# Patient Record
Sex: MALE
[De-identification: names, ages, dates, MRNs, and addresses within clinical notes are randomized per-mention and may not be internally consistent; named-entity substitution may affect disease eponyms.]

---

## 2019-08-09 ENCOUNTER — HOSPITAL ENCOUNTER (EMERGENCY)
Dept: HOSPITAL 5 - ED | Age: 34
Discharge: HOME | End: 2019-08-09
Payer: SELF-PAY

## 2019-08-09 VITALS — DIASTOLIC BLOOD PRESSURE: 90 MMHG | SYSTOLIC BLOOD PRESSURE: 161 MMHG

## 2019-08-09 DIAGNOSIS — G89.18: ICD-10-CM

## 2019-08-09 DIAGNOSIS — L03.116: Primary | ICD-10-CM

## 2019-08-09 DIAGNOSIS — Z79.899: ICD-10-CM

## 2019-08-09 DIAGNOSIS — F17.200: ICD-10-CM

## 2019-08-09 LAB
BASOPHILS # (AUTO): 0 K/MM3 (ref 0–0.1)
BASOPHILS NFR BLD AUTO: 0.7 % (ref 0–1.8)
BUN SERPL-MCNC: 9 MG/DL (ref 9–20)
BUN/CREAT SERPL: 11 %
CALCIUM SERPL-MCNC: 8.8 MG/DL (ref 8.4–10.2)
EOSINOPHIL # BLD AUTO: 0.2 K/MM3 (ref 0–0.4)
EOSINOPHIL NFR BLD AUTO: 2.7 % (ref 0–4.3)
HCT VFR BLD CALC: 29.3 % (ref 35.5–45.6)
HEMOLYSIS INDEX: 0
HGB BLD-MCNC: 9.9 GM/DL (ref 11.8–15.2)
LYMPHOCYTES # BLD AUTO: 1.5 K/MM3 (ref 1.2–5.4)
LYMPHOCYTES NFR BLD AUTO: 26.6 % (ref 13.4–35)
MCHC RBC AUTO-ENTMCNC: 34 % (ref 32–34)
MCV RBC AUTO: 81 FL (ref 84–94)
MONOCYTES # (AUTO): 0.4 K/MM3 (ref 0–0.8)
MONOCYTES % (AUTO): 6.6 % (ref 0–7.3)
PLATELET # BLD: 263 K/MM3 (ref 140–440)
RBC # BLD AUTO: 3.64 M/MM3 (ref 3.65–5.03)

## 2019-08-09 PROCEDURE — 85025 COMPLETE CBC W/AUTO DIFF WBC: CPT

## 2019-08-09 PROCEDURE — 73620 X-RAY EXAM OF FOOT: CPT

## 2019-08-09 PROCEDURE — 36415 COLL VENOUS BLD VENIPUNCTURE: CPT

## 2019-08-09 PROCEDURE — 96365 THER/PROPH/DIAG IV INF INIT: CPT

## 2019-08-09 PROCEDURE — 80048 BASIC METABOLIC PNL TOTAL CA: CPT

## 2019-08-09 PROCEDURE — 96375 TX/PRO/DX INJ NEW DRUG ADDON: CPT

## 2019-08-09 PROCEDURE — 99284 EMERGENCY DEPT VISIT MOD MDM: CPT

## 2019-08-09 PROCEDURE — 82140 ASSAY OF AMMONIA: CPT

## 2019-08-09 NOTE — EMERGENCY DEPARTMENT REPORT
ED Lower Extremity HPI





- General


Chief Complaint: Extremity Injury, Lower


Stated Complaint: LEFT FOOT PAIN


Time Seen by Provider: 19 02:40


Source: patient


Mode of arrival: Ambulatory


Limitations: No Limitations





- History of Present Illness


Initial Comments: 


Post left foot injury with postop infection states pain is worsening unable to 

ambulate yellow drainage had moved back up to 2 weeks ago patient denies fevers 

or chills no nausea vomiting denies new injury fall or trauma





MD Complaint: foot injury


Onset/Timin


-: month(s)


Injury: Foot: Left


Type of Injury: blunt


Place: work


Severity: moderate


Severity scale (0 -10): 5


Improves With: nothing


Worsens With: weight bearing, movement, palpation


Context: other (post op infection)


Associated Symptoms: swelling, able to partially bear weight





- Related Data


                                  Previous Rx's











 Medication  Instructions  Recorded  Last Taken  Type


 


HYDROcodone/APAP 5-325 [Macdoel 1 each PO Q6HR PRN #12 tablet 19 Unknown Rx





5-325 mg TAB]    


 


cephALEXin [Keflex] 500 mg PO Q8HR 10 Days #30 cap 19 Unknown Rx











                                    Allergies











Allergy/AdvReac Type Severity Reaction Status Date / Time


 


No Known Allergies Allergy   Unverified 19 03:11














ED Review of Systems


ROS: 


Stated complaint: LEFT FOOT PAIN


Other details as noted in HPI





Constitutional: denies: chills, fever


Eyes: denies: eye pain, eye discharge, vision change


ENT: denies: ear pain, throat pain


Respiratory: denies: cough, shortness of breath, wheezing


Cardiovascular: denies: chest pain, palpitations


Endocrine: no symptoms reported


Gastrointestinal: denies: abdominal pain, nausea, diarrhea


Genitourinary: denies: urgency, dysuria


Musculoskeletal: other (dorsal foot pain ).  denies: back pain, joint swelling, 

arthralgia


Skin: other (erythema dorsal foot ).  denies: rash, lesions


Neurological: denies: headache, weakness, paresthesias


Psychiatric: denies: anxiety, depression


Hematological/Lymphatic: denies: easy bleeding, easy bruising





ED Past Medical Hx





- Past Medical History


Previous Medical History?: No





- Surgical History


Past Surgical History?: Yes


Additional Surgical History: Left foot injury





- Social History


Smoking Status: Current Every Day Smoker


Substance Use Type: None





- Medications


Home Medications: 


                                Home Medications











 Medication  Instructions  Recorded  Confirmed  Last Taken  Type


 


HYDROcodone/APAP 5-325 [Macdoel 1 each PO Q6HR PRN #12 tablet 19  Unknown Rx





5-325 mg TAB]     


 


cephALEXin [Keflex] 500 mg PO Q8HR 10 Days #30 cap 19  Unknown Rx














ED Physical Exam





- General


Limitations: No Limitations


General appearance: alert





- Head


Head exam: Present: atraumatic, normocephalic





- Eye


Eye exam: Present: normal appearance, PERRL, EOMI


Pupils: Present: normal accommodation





- ENT


ENT exam: Present: mucous membranes moist.  Absent: normal external ear exam





- Neck


Neck exam: Present: normal inspection





- Respiratory


Respiratory exam: Present: normal lung sounds bilaterally.  Absent: respiratory 

distress, wheezes, stridor, chest wall tenderness





- Cardiovascular


Cardiovascular Exam: Present: regular rate, normal rhythm, normal heart sounds. 

 Absent: systolic murmur, diastolic murmur, rubs, gallop





- GI/Abdominal


GI/Abdominal exam: Present: soft, normal bowel sounds





- Rectal


Rectal exam: Present: deferred





- Extremities Exam


Extremities exam: Present: normal inspection, full ROM, tenderness, normal 

capillary refill.  Absent: pedal edema, joint swelling, calf tenderness





- Expanded Lower Extremity Exam


  ** Left


Foot/Toe exam: Present: tenderness, swelling, erythema.  Absent: abrasion, 

laceration, ecchymosis, deformity, crepidus, dislocation, amputation, puncture 

wound, tenderness at base of 5th metatarsal, nail avulsion, subungual hematoma


Neuro vascular tendon exam: Absent: pulse deficit, motor deficit, sensory 

deficit, tendon deficit, peroneal nerve deficit


Gait: Positive: observed and limited by pain





- Back Exam


Back exam: Present: normal inspection, full ROM.  Absent: tenderness, CVA 

tenderness (R), muscle spasm, paraspinal tenderness, vertebral tenderness, rash 

noted





- Neurological Exam


Neurological exam: Present: alert, oriented X3, CN II-XII intact, normal gait, 

reflexes normal.  Absent: motor sensory deficit





- Psychiatric


Psychiatric exam: Present: normal affect





- Skin


Skin exam: Present: warm, dry, intact, normal color.  Absent: rash





ED Course


                                   Vital Signs











  19





  01:17


 


Temperature 98.2 F


 


Pulse Rate 101 H


 


Respiratory 18





Rate 


 


Blood Pressure 161/90


 


O2 Sat by Pulse 97





Oximetry 














ED Lower Extremity MDM





- Lab Data


Result diagrams: 


                                 19 03:10





                                 19 03:10





- Radiology Data


Radiology results: report reviewed, image reviewed








Ordering Physician: RHODA REEDER NP 


Date of Service: 19 


Procedure(s): XR foot 2V LT 


Accession Number(s): [id number] 





cc: RHODA REEDER NP 





Fluoro Time In Minutes: 





Left foot, 2 views 





INDICATION: Postop pain, infection 





FINDINGS: There is slight bunion formation at the head of the first metatarsal. 

The joint spaces 


are intact. There is no fracture or dislocation. No spurring or arthritic 

change. No foreign body is


seen. There is no osteomyelitis identified. No significant abnormality. 





Signer Name: Christophe Raphael MD 


Signed: 2019 3:00 AM 


Workstation Name: [id number] 








Transcribed By: JESSICA 


Dictated By: Christophe Raphael MD 


Electronically Authenticated By: Christophe Raphael MD 


Signed Date/Time: 19 0300 











DD/DT: 19 0259 


TD/TT:








- Medical Decision Making


xray normal no fracture ostesomyelitis plan dc to home with keflex, hydrocodone,

 follow up [facility] orthopedic surgery, Dr. Norris, pain is improve there is no 

drainage , no fever no chills, deformity, distal pulses +2 bilat, pt verbalized 

agreement and understanding of same. 





Critical care attestation.: 


If time is entered above; I have spent that time in minutes in the direct care 

of this critically ill patient, excluding procedure time.








ED Disposition


Clinical Impression: 


 Cellulitis of foot, Post-op pain





Disposition: DC-01 TO HOME OR SELFCARE


Is pt being admited?: No


Does the pt Need Aspirin: No


Condition: Stable


Instructions:  Cellulitis (ED)


Prescriptions: 


cephALEXin [Keflex] 500 mg PO Q8HR 10 Days #30 cap


HYDROcodone/APAP 5-325 [Norco 5-325 mg TAB] 1 each PO Q6HR PRN #12 tablet


 PRN Reason: Pain


Referrals: 


LILLIAN NORRIS MD [Staff Physician] - 3-5 Days


Forms:  Work/School Release Form(ED)


Time of Disposition: 04:24

## 2019-08-09 NOTE — XRAY REPORT
Left foot, 2 views



INDICATION: Postop pain, infection



FINDINGS: There is slight bunion formation at the head of the first metatarsal. The joint spaces are 
intact. There is no fracture or dislocation. No spurring or arthritic change. No foreign body is seen
. There is no osteomyelitis identified. No significant abnormality.



Signer Name: Christophe Raphael MD 

Signed: 8/9/2019 3:00 AM

 Workstation Name: [id number]